# Patient Record
Sex: FEMALE | Race: WHITE | Employment: FULL TIME | URBAN - METROPOLITAN AREA
[De-identification: names, ages, dates, MRNs, and addresses within clinical notes are randomized per-mention and may not be internally consistent; named-entity substitution may affect disease eponyms.]

---

## 2022-03-19 PROBLEM — G47.10 HYPERSOMNOLENCE: Status: ACTIVE | Noted: 2018-07-03

## 2022-03-20 PROBLEM — F90.0 ATTENTION DEFICIT HYPERACTIVITY DISORDER, INATTENTIVE TYPE: Status: ACTIVE | Noted: 2018-12-11

## 2023-01-02 NOTE — PROGRESS NOTES
1/3/2023    Brissa Robledo  1980      PCP: Natasha Frazier MD  Patient does see them for regular preventative visits. HPI: 43y.o. year old No obstetric history on file. Here for annual gyn wellness exam.   Now ! BC = vas. Has had no period since October. Had neg HPT in Nov.   Pt thinks she had colpo last yr, which I cannot find. We discussed doing this d/t some bldg or spotting abnormality. There is no note, and no path report either. She is no longer having any abnl bldg. No PCB. Pt's sister was dx with bilateral breast ca. At age 55. Pt says that her sister had neg genetic testing of 30 something genes. There are no other cancers in her family. Patient's last menstrual period was 10/01/2022 (within weeks). Social History     Socioeconomic History    Marital status:      Spouse name: None    Number of children: None    Years of education: None    Highest education level: None   Tobacco Use    Smoking status: Never    Smokeless tobacco: Never   Substance and Sexual Activity    Alcohol use: No    Drug use: No    Sexual activity: Yes     Partners: Male     Birth control/protection: Pill     Comment: Only my    Social History Narrative    She is a  at [employer]. She is single     Last pap - atypical endocervical cells, neg HPV . No prior hx abnl paps  Lipids- followed by PCP  Gardasil - has not had  Mammogram- NL 4-2022      Allergies, medications, past medical and surgical history, social history, family history all reviewed.        Review of Systems      Constitutional:  Denies unexplained weight loss/gain or heat/ cold intolerance/loss of balance  ENT: Denies blurred vision, loss of hearing, hoarseness  Cardiovascular:  Denies chest pain, swelling in legs or feet, shortness of breath when lying flat  Respiratory:  Denies shortness of breath, cough greater than 2 weeks or coughing up blood  Gastro: Denies diarrhea greater than 2 weeks, rectal bleeding, bloody stools, heartburn, or constipation  :  Denies blood in urine, nocturia, dysuria or incontinence  Breast:  Denies nipple discharge, masses or pain  Skin:  Denies rash greater than 2 weeks, change in moles  Musculoskeletal/Neuro:  Denies joint pain, muscle weakness, seizures, headaches  Psych:  Denies new onset depression, anxiety, panic attacks  Heme:  Denies easy bruising, bleeding gums, frequent nosebleeds or swollen lymph nodes  GYN:  Denies bleeding or spotting between menses, heavy menses, menses longer than 7 days, pain with sex, severe menstrual cramps, bleeding after sex    Patient referred to a PCP for any significant nongyn findings on ROS. /72   Ht 5' 7\" (1.702 m)   Wt 196 lb (88.9 kg)   LMP 10/01/2022 (Within Weeks)   BMI 30.70 kg/m²     Body mass index is 30.7 kg/m². Wt Readings from Last 3 Encounters:   01/03/23 196 lb (88.9 kg)   12/22/21 175 lb 3.2 oz (79.5 kg)         Pt in no distress. Alert and oriented x3. Affect bright. Well developed, well nourished. HEENT: normocephalic, atraumatic. Sclerae nonicteric. Neck supple w/o thyromegaly. Trachea midline. Lungs:  Respiratory effort normal.   Breasts: no masses or axillary lymphadenopathy  Abdomen: soft and nontender, no masses, no organomegaly, no ventral hernia  Pelvic: normal external genitalia, urethral meatus, vagina and cervix. Large ectropion. Minimally friable. Bimanual exam w/o obvious masses or tenderness. Bladder nontender. Uterus normal size. Adnexae normal.  Perineum and anus normal. No hemorrhoids. Martha Shaw was seen today for annual exam.    Diagnoses and all orders for this visit:    Well woman exam    Encounter for screening mammogram for malignant neoplasm of breast  -     [facility] HUNTER DIGITAL SCREEN BILATERAL;  Future    Atypical glandular cells of undetermined significance (TANA) on cervical Pap smear  -     PAP IG, Aptima HPV and rfx 16/18,45 ([id number])    Screening for cervical cancer  - PAP IG, Aptima HPV and rfx 16/18,45 ([id number])    Screening for human papillomavirus (HPV)  -     PAP IG, Aptima HPV and rfx 16/18,45 ([id number])    Missed menses    Other orders  -     medroxyPROGESTERone (PROVERA) 10 MG tablet; Take 1 tablet by mouth daily          Pt counseling:   Missed menses most likely d/t a missed ovulation perimenopause. Pt has another HPT at her home. She will repeat it, make sure it is neg, then take course of provera. Pt thinks she had colpo last yr but I see nothing. Will have her rtn for colpo in 3-4wks after today's pap is back, even if this pap is NL- I stressed that to the pt. We also need to discuss her breast ca risk. May be a candidate for mri screening. Will have a separate appt that same day to calc and discuss her TC risk score.      Kenton Etienne MD, MD

## 2023-01-03 ENCOUNTER — OFFICE VISIT (OUTPATIENT)
Dept: OBGYN CLINIC | Age: 43
End: 2023-01-03
Payer: COMMERCIAL

## 2023-01-03 VITALS
DIASTOLIC BLOOD PRESSURE: 72 MMHG | SYSTOLIC BLOOD PRESSURE: 126 MMHG | WEIGHT: 196 LBS | HEIGHT: 67 IN | BODY MASS INDEX: 30.76 KG/M2

## 2023-01-03 DIAGNOSIS — N92.6 MISSED MENSES: ICD-10-CM

## 2023-01-03 DIAGNOSIS — Z11.51 SCREENING FOR HUMAN PAPILLOMAVIRUS (HPV): ICD-10-CM

## 2023-01-03 DIAGNOSIS — R87.619 ATYPICAL GLANDULAR CELLS OF UNDETERMINED SIGNIFICANCE (AGUS) ON CERVICAL PAP SMEAR: ICD-10-CM

## 2023-01-03 DIAGNOSIS — Z12.4 SCREENING FOR CERVICAL CANCER: ICD-10-CM

## 2023-01-03 DIAGNOSIS — Z01.419 WELL WOMAN EXAM: Primary | ICD-10-CM

## 2023-01-03 DIAGNOSIS — Z12.31 ENCOUNTER FOR SCREENING MAMMOGRAM FOR MALIGNANT NEOPLASM OF BREAST: ICD-10-CM

## 2023-01-03 PROBLEM — F90.0 ADHD, PREDOMINANTLY INATTENTIVE TYPE: Status: ACTIVE | Noted: 2018-12-11

## 2023-01-03 PROBLEM — E55.9 VITAMIN D DEFICIENCY: Status: ACTIVE | Noted: 2022-09-07

## 2023-01-03 PROBLEM — D64.9 ANEMIA: Status: ACTIVE | Noted: 2022-09-07

## 2023-01-03 PROBLEM — E03.9 ACQUIRED HYPOTHYROIDISM: Status: ACTIVE | Noted: 2022-09-07

## 2023-01-03 PROBLEM — E66.9 OBESITY: Status: ACTIVE | Noted: 2022-09-07

## 2023-01-03 PROBLEM — R00.2 PALPITATIONS: Status: ACTIVE | Noted: 2022-09-12

## 2023-01-03 PROCEDURE — 99396 PREV VISIT EST AGE 40-64: CPT | Performed by: OBSTETRICS & GYNECOLOGY

## 2023-01-03 RX ORDER — VALACYCLOVIR HYDROCHLORIDE 1 G/1
2 TABLET, FILM COATED ORAL 2 TIMES DAILY
COMMUNITY

## 2023-01-03 RX ORDER — PHENTERMINE HYDROCHLORIDE 37.5 MG/1
37.5 TABLET ORAL DAILY
COMMUNITY
Start: 2021-12-04

## 2023-01-03 RX ORDER — LEVOTHYROXINE SODIUM 0.07 MG/1
TABLET ORAL
COMMUNITY
Start: 2022-11-12

## 2023-01-03 RX ORDER — MEDROXYPROGESTERONE ACETATE 10 MG/1
10 TABLET ORAL DAILY
Qty: 10 TABLET | Refills: 0 | Status: SHIPPED | OUTPATIENT
Start: 2023-01-03

## 2023-01-29 NOTE — PROGRESS NOTES
Colposcopy Exam      2023    Name:  Uche Marie    :  1980 Age:  43 y.o. Indication for Colposcopy:  TANA pap with neg hpv . Neg cotest pap . Abnormal Pap and Colposcopy History:  none. Gardasil:  has not had    No obstetric history on file. No LMP recorded. Birth Control: vasectomy  Tobacco Use:    Social History     Tobacco Use   Smoking Status Never   Smokeless Tobacco Never     Medications:    Current Outpatient Medications on File Prior to Visit   Medication Sig Dispense Refill    levothyroxine (SYNTHROID) 75 MCG tablet       phentermine (ADIPEX-P) 37.5 MG tablet Take 37.5 mg by mouth daily. medroxyPROGESTERone (PROVERA) 10 MG tablet Take 1 tablet by mouth daily 10 tablet 0    sulfamethoxazole-trimethoprim (BACTRIM DS;SEPTRA DS) 800-160 MG per tablet TAKE 1 TABLET BY MOUTH TWICE DAILY      valACYclovir (VALTREX) 1 g tablet Take 2 g by mouth 2 times daily (Patient not taking: No sig reported)       No current facility-administered medications on file prior to visit. uHCG:  negative        /68   Ht 5' 7\" (1.702 m)   Wt 199 lb (90.3 kg)   BMI 31.17 kg/m²       Procedure:  Patient was placed in the lithotomy position. The vulva was examined for suspicious lesions. Subsequently a speculum was inserted and the cervix and upper vaginal wall were visualized. 3% Acetic acid was applied to the cervix and upper vagina. Abnormal areas were not identified. Biopsies were not obtained. Very large ectropion with healthy glandular tissue throughout and no AW changes adjacent. Physical Exam  Genitourinary:               Adequate Procedure: yes   (This applies to visibility, or lack thereof, of the entire cervix and the SCJ. And whether or not an acetowhite lesion can be fully visualized.)  Pap Performed:  no  ECC Performed: yes    Caveats for billers/coders/3rd parties:    Location, relative sizes, features of lesions are noted on the drawing.  Or in word text in the body of this note. True size cannot be documented, as colposcopes do not have mm/cm measuring tools. Acetowhite, vascular, border changes are noted on the drawing. If there are none noted, there are none present. Colposcopy can Not determine low grade or high grade or cancerous lesions. Pathology results are necessary for that. Sathya Patel was seen today for procedure. Diagnoses and all orders for this visit:    Atypical glandular cells of undetermined significance (TANA) on cervical Pap smear    Family history of breast cancer in sister    Pre-procedure lab exam  -     AMB POC URINE PREGNANCY TEST, VISUAL COLOR COMPARISON         Plan:will let pt know ECC results and any f/up needed.      Nunu Schultz MD, MD

## 2023-01-29 NOTE — PROGRESS NOTES
2023      Robyne Habermann  : 1980  43 y. o. No obstetric history on file. HPI: appt to d/w pt her breast ca risk. Pt's sister was dx with bilateral breast ca. At age 55. Pt says that her sister had neg genetic testing of 30 something genes. There are no other cancers in her family. Pt had NL mammo , BD2    Pt and I went to TC risk calculator on line and put in her fam hx, BD score, etc. Her lifetime risk for breast ca is 22%. Exam:  /68   Ht 5' 7\" (1.702 m)   Wt 199 lb (90.3 kg)   BMI 31.17 kg/m²     Body mass index is 31.17 kg/m². Pt in no distress. Alert and oriented x3. Affect bright. Well developed, well nourished. HEENT: normocephalic, atraumatic. Sclerae nonicteric. Neuro: grossly intact    Sohail Gann was seen today for procedure. Diagnoses and all orders for this visit:      Family history of breast cancer in sister  Increased risk for breast cancer  Obesity with bmi 31    D/w pt MRI screening, the logistics, financial aspects, higher false + rate for need for bx. Pt is not interested at this pt in time. But she will let me know if she is in the future. Also d/w her keeping her EtOH intake light (she currently does not drink any) and the benefits of lower wt/bmi. We discussed Mediterranean type diet. Also rec the book How to lose weight for the last time.        Lorenzo Dominguez MD, MD

## 2023-01-31 ENCOUNTER — PROCEDURE VISIT (OUTPATIENT)
Dept: OBGYN CLINIC | Age: 43
End: 2023-01-31

## 2023-01-31 VITALS
HEIGHT: 67 IN | WEIGHT: 199 LBS | BODY MASS INDEX: 31.23 KG/M2 | SYSTOLIC BLOOD PRESSURE: 120 MMHG | DIASTOLIC BLOOD PRESSURE: 68 MMHG

## 2023-01-31 DIAGNOSIS — Z80.3 FAMILY HISTORY OF BREAST CANCER IN SISTER: ICD-10-CM

## 2023-01-31 DIAGNOSIS — R87.619 ATYPICAL GLANDULAR CELLS OF UNDETERMINED SIGNIFICANCE (AGUS) ON CERVICAL PAP SMEAR: Primary | ICD-10-CM

## 2023-01-31 DIAGNOSIS — E66.9 CLASS 1 OBESITY WITHOUT SERIOUS COMORBIDITY WITH BODY MASS INDEX (BMI) OF 31.0 TO 31.9 IN ADULT, UNSPECIFIED OBESITY TYPE: ICD-10-CM

## 2023-01-31 DIAGNOSIS — Z01.812 PRE-PROCEDURE LAB EXAM: ICD-10-CM

## 2023-01-31 DIAGNOSIS — Z91.89 INCREASED RISK OF BREAST CANCER: ICD-10-CM

## 2023-01-31 LAB
HCG, PREGNANCY, URINE, POC: NEGATIVE
VALID INTERNAL CONTROL, POC: YES

## 2023-01-31 RX ORDER — SULFAMETHOXAZOLE AND TRIMETHOPRIM 800; 160 MG/1; MG/1
TABLET ORAL
COMMUNITY
Start: 2023-01-17

## 2023-04-07 ENCOUNTER — HOSPITAL ENCOUNTER (OUTPATIENT)
Dept: MAMMOGRAPHY | Age: 43
Discharge: HOME | End: 2023-04-07
Payer: COMMERCIAL

## 2023-04-07 DIAGNOSIS — Z12.31 ENCOUNTER FOR SCREENING MAMMOGRAM FOR MALIGNANT NEOPLASM OF BREAST: ICD-10-CM

## 2023-04-07 PROCEDURE — 77063 BREAST TOMOSYNTHESIS BI: CPT

## 2023-06-22 NOTE — PROGRESS NOTES
After pt's benign ECC from colpo in 1-2023 was back, I called pt and discussed the results with her. Pap at same time also neg cotesting. The prior pap was atypical endocervical cells, neg hr hpv. Offered her further eval with hysteroscopy, consideration of CKC. She said she would d/w her . She has not reached back out to me. No c/o abnl bldg when last seen by me. Repap NL. ECC benign. And the shay pap was specifically characterized as endocervical cells. Not pursuing this any further at this time. She has AE appt scheduled for 1-9-24.

## 2023-11-16 ENCOUNTER — HOSPITAL ENCOUNTER (OUTPATIENT)
Dept: NON INVASIVE DIAGNOSTICS | Age: 43
Discharge: HOME | End: 2023-11-18
Payer: COMMERCIAL

## 2023-11-16 DIAGNOSIS — R00.2 PALPITATIONS: ICD-10-CM

## 2023-11-16 DIAGNOSIS — R00.2 PALPITATIONS: Primary | ICD-10-CM

## 2023-11-16 LAB
EKG ATRIAL RATE: 105 BPM
EKG DIAGNOSIS: NORMAL
EKG P AXIS: 62 DEGREES
EKG P-R INTERVAL: 148 MS
EKG Q-T INTERVAL: 324 MS
EKG QRS DURATION: 74 MS
EKG QTC CALCULATION (BAZETT): 428 MS
EKG R AXIS: 72 DEGREES
EKG T AXIS: 53 DEGREES
EKG VENTRICULAR RATE: 105 BPM

## 2023-11-16 PROCEDURE — 93005 ELECTROCARDIOGRAM TRACING: CPT | Performed by: INTERNAL MEDICINE

## 2023-11-16 PROCEDURE — 93010 ELECTROCARDIOGRAM REPORT: CPT | Performed by: INTERNAL MEDICINE

## 2024-01-09 SDOH — ECONOMIC STABILITY: HOUSING INSECURITY
IN THE LAST 12 MONTHS, WAS THERE A TIME WHEN YOU DID NOT HAVE A STEADY PLACE TO SLEEP OR SLEPT IN A SHELTER (INCLUDING NOW)?: NO

## 2024-01-09 SDOH — ECONOMIC STABILITY: FOOD INSECURITY: WITHIN THE PAST 12 MONTHS, YOU WORRIED THAT YOUR FOOD WOULD RUN OUT BEFORE YOU GOT MONEY TO BUY MORE.: NEVER TRUE

## 2024-01-09 SDOH — ECONOMIC STABILITY: TRANSPORTATION INSECURITY
IN THE PAST 12 MONTHS, HAS LACK OF TRANSPORTATION KEPT YOU FROM MEETINGS, WORK, OR FROM GETTING THINGS NEEDED FOR DAILY LIVING?: NO

## 2024-01-09 SDOH — ECONOMIC STABILITY: FOOD INSECURITY: WITHIN THE PAST 12 MONTHS, THE FOOD YOU BOUGHT JUST DIDN'T LAST AND YOU DIDN'T HAVE MONEY TO GET MORE.: NEVER TRUE

## 2024-01-09 SDOH — ECONOMIC STABILITY: INCOME INSECURITY: HOW HARD IS IT FOR YOU TO PAY FOR THE VERY BASICS LIKE FOOD, HOUSING, MEDICAL CARE, AND HEATING?: NOT HARD AT ALL

## 2024-01-11 ENCOUNTER — OFFICE VISIT (OUTPATIENT)
Dept: OBGYN CLINIC | Age: 44
End: 2024-01-11
Payer: COMMERCIAL

## 2024-01-11 VITALS
SYSTOLIC BLOOD PRESSURE: 128 MMHG | HEIGHT: 67 IN | BODY MASS INDEX: 32.33 KG/M2 | DIASTOLIC BLOOD PRESSURE: 70 MMHG | WEIGHT: 206 LBS

## 2024-01-11 DIAGNOSIS — Z11.51 SCREENING FOR HUMAN PAPILLOMAVIRUS (HPV): ICD-10-CM

## 2024-01-11 DIAGNOSIS — Z01.419 WELL WOMAN EXAM: Primary | ICD-10-CM

## 2024-01-11 DIAGNOSIS — Z80.3 FAMILY HISTORY OF BREAST CANCER IN SISTER: ICD-10-CM

## 2024-01-11 DIAGNOSIS — Z91.89 INCREASED RISK OF BREAST CANCER: ICD-10-CM

## 2024-01-11 DIAGNOSIS — Z12.4 SCREENING FOR CERVICAL CANCER: ICD-10-CM

## 2024-01-11 DIAGNOSIS — Z01.42 PAP SMEAR OF CERVIX TO CONFIRM NORMAL SMEAR FOLLOWING ABNORMAL SMEAR: ICD-10-CM

## 2024-01-11 DIAGNOSIS — Z12.31 ENCOUNTER FOR SCREENING MAMMOGRAM FOR MALIGNANT NEOPLASM OF BREAST: ICD-10-CM

## 2024-01-11 PROCEDURE — 99396 PREV VISIT EST AGE 40-64: CPT | Performed by: OBSTETRICS & GYNECOLOGY

## 2024-01-11 RX ORDER — SPIRONOLACTONE 25 MG/1
25 TABLET ORAL DAILY
COMMUNITY

## 2024-01-11 NOTE — PROGRESS NOTES
1/11/2024    Lelia Medina  1980      PCP: Nicki Arellano MD  Patient does see them for regular preventative visits.      HPI: 43 y.o. year old No obstetric history on file. Here for annual gyn wellness exam.   Menses ok. Had gone w/o menses for a while, then I gave her Progestin course. Now having menses again.   Her pcp has referred to [facility] high risk breast clinic for increased cancer risk.   BC = vas.   almost 2yrs now. Her  is struggling with some health issues they have not been able to get answers for yet.    Patient's last menstrual period was 12/31/2023 (within days).    Social History     Socioeconomic History    Marital status:      Spouse name: None    Number of children: None    Years of education: None    Highest education level: None   Tobacco Use    Smoking status: Never    Smokeless tobacco: Never   Vaping Use    Vaping Use: Never used   Substance and Sexual Activity    Alcohol use: No    Drug use: Never    Sexual activity: Yes     Partners: Male     Birth control/protection: Surgical     Comment: vasectomy   Social History Narrative    She is a  at [employer]. She is single     Last pap - neg cotesting 1-2023. Colpo NL and satis 1-2023. ECC benign.  Atypical endocervical cells, neg HPV . No prior hx abnl paps  Lipids- followed by PCP  Gardasil - has not had  Mammogram- NL 4-2023. BD2. Pt's TC risk is ~20%. She and I calc'd it at 22 last yr. Rads calc'd it at 16% with most recent mammogram.     Allergies, medications, past medical and surgical history, social history, family history all reviewed.       Review of Systems      Constitutional:  Denies unexplained weight loss/gain or heat/ cold intolerance/loss of balance  ENT: Denies blurred vision, loss of hearing, hoarseness  Cardiovascular:  Denies chest pain, swelling in legs or feet, shortness of breath when lying flat  Respiratory:  Denies shortness of breath, cough greater than 2 weeks or coughing

## 2024-01-18 LAB
COLLECTION METHOD: NORMAL
CYTOLOGIST CVX/VAG CYTO: NORMAL
CYTOLOGY CVX/VAG DOC THIN PREP: NORMAL
DATE OF LMP: NORMAL
HPV APTIMA: NEGATIVE
HPV GENOTYPE REFLEX: NORMAL
Lab: NORMAL
OTHER PT INFO: NORMAL
PAP SOURCE: NORMAL
PATH REPORT.FINAL DX SPEC: NORMAL
PREV CYTO INFO: NEGATIVE
PREV TREATMENT: NORMAL
STAT OF ADQ CVX/VAG CYTO-IMP: NORMAL

## 2024-02-22 ENCOUNTER — OFFICE VISIT (OUTPATIENT)
Dept: OBGYN CLINIC | Age: 44
End: 2024-02-22

## 2024-02-22 ENCOUNTER — PROCEDURE VISIT (OUTPATIENT)
Dept: OBGYN CLINIC | Age: 44
End: 2024-02-22
Payer: COMMERCIAL

## 2024-02-22 VITALS
DIASTOLIC BLOOD PRESSURE: 78 MMHG | BODY MASS INDEX: 32.49 KG/M2 | SYSTOLIC BLOOD PRESSURE: 124 MMHG | HEIGHT: 67 IN | WEIGHT: 207 LBS

## 2024-02-22 DIAGNOSIS — N92.1 METRORRHAGIA: Primary | ICD-10-CM

## 2024-02-22 LAB
HCG, PREGNANCY, URINE, POC: NEGATIVE
VALID INTERNAL CONTROL, POC: YES

## 2024-02-22 PROCEDURE — 81025 URINE PREGNANCY TEST: CPT | Performed by: OBSTETRICS & GYNECOLOGY

## 2024-02-22 PROCEDURE — 58100 BIOPSY OF UTERUS LINING: CPT | Performed by: OBSTETRICS & GYNECOLOGY

## 2024-02-22 PROCEDURE — 76831 ECHO EXAM UTERUS: CPT | Performed by: OBSTETRICS & GYNECOLOGY

## 2024-02-22 PROCEDURE — 58340 CATHETER FOR HYSTEROGRAPHY: CPT | Performed by: OBSTETRICS & GYNECOLOGY

## 2024-02-22 NOTE — PROGRESS NOTES
SONOHYSTEROGRAM    2/22/2024    Lelia Medina  1980      43 y.o.             No LMP recorded. Patient is premenopausal.             Birth Control: vas                    UCG: neg    History: irreg menses with at least one cycle <21d      US shows:  Uterus vol 91  Stripe 10.6mm  Septated cyst on R, 3.09cm average D. One thin septation, appear benign  Images reviewed.  Official report sent for scanning      /78   Ht 1.702 m (5' 7\")   Wt 93.9 kg (207 lb)   BMI 32.42 kg/m²       Patient was placed in the lithotomy position and speculum was inserted. Cervix visualized and cleansed with betadine. Single-tooth tenaculum was not applied to the anterior cervix. Catheter was inserted through the OS. Sounding depth was 8 CM. Speculum and/or tenaculum were removed. Sterile saline 10 cc was instilled and ultrasound revealed no focal lesion.    Endometrial biopsy was  obtained resulting in moderate amount of tissue for pathology. Patient tolerated procedure well.    Lelia was seen today for procedure and ultrasound.    Diagnoses and all orders for this visit:    Metrorrhagia        Follow-Up Plan:   Per pt this abnormally short cycle has only happened once. LMP started early Feb.  Jan 1.  If endo bx benign, pursue expectant management.     Unable to get any of the u/s orders to pull into this note.    Candy Nicole MD

## 2024-07-05 ENCOUNTER — HOSPITAL ENCOUNTER (OUTPATIENT)
Dept: MAMMOGRAPHY | Age: 44
Discharge: HOME | End: 2024-07-05
Payer: COMMERCIAL

## 2024-07-05 DIAGNOSIS — Z12.31 ENCOUNTER FOR SCREENING MAMMOGRAM FOR BREAST CANCER: ICD-10-CM

## 2024-07-05 PROCEDURE — 77063 BREAST TOMOSYNTHESIS BI: CPT

## 2025-05-05 ENCOUNTER — TRANSCRIBE ORDERS (OUTPATIENT)
Facility: HOSPITAL | Age: 45
End: 2025-05-05

## 2025-05-05 DIAGNOSIS — Z12.31 OTHER SCREENING MAMMOGRAM: Primary | ICD-10-CM

## 2025-07-07 ENCOUNTER — HOSPITAL ENCOUNTER (OUTPATIENT)
Dept: MAMMOGRAPHY | Age: 45
Discharge: HOME | End: 2025-07-10
Payer: COMMERCIAL

## 2025-07-07 VITALS — HEIGHT: 67 IN | BODY MASS INDEX: 28.25 KG/M2 | WEIGHT: 180 LBS

## 2025-07-07 DIAGNOSIS — Z12.31 OTHER SCREENING MAMMOGRAM: ICD-10-CM

## 2025-07-07 PROCEDURE — 77063 BREAST TOMOSYNTHESIS BI: CPT

## 2025-07-14 ENCOUNTER — OFFICE VISIT (OUTPATIENT)
Dept: ENT CLINIC | Age: 45
End: 2025-07-14
Payer: COMMERCIAL

## 2025-07-14 VITALS
BODY MASS INDEX: 27.94 KG/M2 | RESPIRATION RATE: 19 BRPM | HEART RATE: 90 BPM | HEIGHT: 67 IN | OXYGEN SATURATION: 100 % | WEIGHT: 178 LBS

## 2025-07-14 DIAGNOSIS — R49.0 DYSPHONIA: Primary | ICD-10-CM

## 2025-07-14 DIAGNOSIS — R13.10 DYSPHAGIA, UNSPECIFIED TYPE: ICD-10-CM

## 2025-07-14 PROCEDURE — 99243 OFF/OP CNSLTJ NEW/EST LOW 30: CPT | Performed by: STUDENT IN AN ORGANIZED HEALTH CARE EDUCATION/TRAINING PROGRAM

## 2025-07-14 PROCEDURE — 31575 DIAGNOSTIC LARYNGOSCOPY: CPT | Performed by: STUDENT IN AN ORGANIZED HEALTH CARE EDUCATION/TRAINING PROGRAM

## 2025-07-14 ASSESSMENT — ENCOUNTER SYMPTOMS
CONSTIPATION: 0
SINUS PRESSURE: 0
CHOKING: 0
EYE ITCHING: 0
EYE PAIN: 0
NAUSEA: 0
EYE DISCHARGE: 0
SHORTNESS OF BREATH: 0
DIARRHEA: 0
COUGH: 0
FACIAL SWELLING: 0
STRIDOR: 0
APNEA: 0
SINUS PAIN: 0
WHEEZING: 0

## 2025-07-14 NOTE — PROGRESS NOTES
HPI:    Lelia Medina is a 45 y.o. female seen New    Chief Complaint   Patient presents with    Hoarse     Patient presents today with c/o voice changes and frequent dysphagia .        History of Present Illness  45-year-old female presents for evaluation of vocal changes and dysphagia.    Experiencing raspy, weak voice with decreased pitch variation for a year. No throat pain or breathing difficulty. Uncertain if symptoms followed an illness.    Reports frequent food impaction during meals, requiring regurgitation. No choking or breathing issues. No problems with liquid intake.        Past Medical History, Past Surgical History, Family history, Social History, and Medications were all reviewed with the patient today and updated as necessary.     No Known Allergies    Patient Active Problem List   Diagnosis    BMI 29.0-29.9,adult    Hypersomnolence    Acquired hypothyroidism    ADHD, predominantly inattentive type    Anemia    Irregular menses    Obesity    Palpitations    Vitamin D deficiency       Current Outpatient Medications   Medication Sig    spironolactone (ALDACTONE) 25 MG tablet Take 1 tablet by mouth daily    levothyroxine (SYNTHROID) 75 MCG tablet     phentermine (ADIPEX-P) 37.5 MG tablet Take 1 tablet by mouth daily.     No current facility-administered medications for this visit.       Past Medical History:   Diagnosis Date    Acquired hypothyroidism     ADD (attention deficit disorder)     Anemia 9/7/2022    BMI 29.0-29.9,adult        Past Surgical History:   Procedure Laterality Date    WISDOM TOOTH EXTRACTION         Social History     Tobacco Use    Smoking status: Never    Smokeless tobacco: Never   Substance Use Topics    Alcohol use: No       Family History   Problem Relation Age of Onset    Hypertension Mother     Other Father         blood clot    Diabetes Father 53    Hypertension Father     Thyroid Disease Sister     Breast Cancer Sister 46    Hypertension Sister     Cancer Sister